# Patient Record
Sex: MALE | Race: BLACK OR AFRICAN AMERICAN | Employment: FULL TIME | URBAN - METROPOLITAN AREA
[De-identification: names, ages, dates, MRNs, and addresses within clinical notes are randomized per-mention and may not be internally consistent; named-entity substitution may affect disease eponyms.]

---

## 2019-12-30 ENCOUNTER — HOSPITAL ENCOUNTER (EMERGENCY)
Age: 24
Discharge: HOME | End: 2019-12-30
Attending: EMERGENCY MEDICINE
Payer: COMMERCIAL

## 2019-12-30 ENCOUNTER — APPOINTMENT (OUTPATIENT)
Dept: GENERAL RADIOLOGY | Age: 24
End: 2019-12-30
Payer: COMMERCIAL

## 2019-12-30 VITALS
HEART RATE: 70 BPM | HEIGHT: 70 IN | TEMPERATURE: 99.6 F | WEIGHT: 182 LBS | DIASTOLIC BLOOD PRESSURE: 58 MMHG | OXYGEN SATURATION: 93 % | SYSTOLIC BLOOD PRESSURE: 104 MMHG | BODY MASS INDEX: 26.05 KG/M2 | RESPIRATION RATE: 14 BRPM

## 2019-12-30 LAB
ANION GAP SERPL CALCULATED.3IONS-SCNC: 12 MMOL/L (ref 7–16)
BUN BLDV-MCNC: 9 MG/DL (ref 6–20)
CALCIUM SERPL-MCNC: 8.9 MG/DL (ref 8.6–10.2)
CHLORIDE BLD-SCNC: 95 MMOL/L (ref 98–107)
CO2: 27 MMOL/L (ref 22–29)
CREAT SERPL-MCNC: 1.3 MG/DL (ref 0.7–1.2)
GFR AFRICAN AMERICAN: >60
GFR NON-AFRICAN AMERICAN: >60 ML/MIN/1.73
GLUCOSE BLD-MCNC: 90 MG/DL (ref 74–99)
INFLUENZA A BY PCR: NOT DETECTED
INFLUENZA B BY PCR: DETECTED
POTASSIUM REFLEX MAGNESIUM: 3.7 MMOL/L (ref 3.5–5)
SODIUM BLD-SCNC: 134 MMOL/L (ref 132–146)
TROPONIN: <0.01 NG/ML (ref 0–0.03)

## 2019-12-30 PROCEDURE — 96374 THER/PROPH/DIAG INJ IV PUSH: CPT

## 2019-12-30 PROCEDURE — 6360000002 HC RX W HCPCS: Performed by: EMERGENCY MEDICINE

## 2019-12-30 PROCEDURE — 87502 INFLUENZA DNA AMP PROBE: CPT

## 2019-12-30 PROCEDURE — 93005 ELECTROCARDIOGRAM TRACING: CPT | Performed by: PHYSICIAN ASSISTANT

## 2019-12-30 PROCEDURE — 84484 ASSAY OF TROPONIN QUANT: CPT

## 2019-12-30 PROCEDURE — 99284 EMERGENCY DEPT VISIT MOD MDM: CPT

## 2019-12-30 PROCEDURE — 2580000003 HC RX 258: Performed by: EMERGENCY MEDICINE

## 2019-12-30 PROCEDURE — 6370000000 HC RX 637 (ALT 250 FOR IP): Performed by: PHYSICIAN ASSISTANT

## 2019-12-30 PROCEDURE — 71046 X-RAY EXAM CHEST 2 VIEWS: CPT

## 2019-12-30 PROCEDURE — 96375 TX/PRO/DX INJ NEW DRUG ADDON: CPT

## 2019-12-30 PROCEDURE — 80048 BASIC METABOLIC PNL TOTAL CA: CPT

## 2019-12-30 RX ORDER — DIPHENHYDRAMINE HYDROCHLORIDE 50 MG/ML
50 INJECTION INTRAMUSCULAR; INTRAVENOUS ONCE
Status: COMPLETED | OUTPATIENT
Start: 2019-12-30 | End: 2019-12-30

## 2019-12-30 RX ORDER — KETOROLAC TROMETHAMINE 30 MG/ML
15 INJECTION, SOLUTION INTRAMUSCULAR; INTRAVENOUS ONCE
Status: COMPLETED | OUTPATIENT
Start: 2019-12-30 | End: 2019-12-30

## 2019-12-30 RX ORDER — 0.9 % SODIUM CHLORIDE 0.9 %
1000 INTRAVENOUS SOLUTION INTRAVENOUS ONCE
Status: COMPLETED | OUTPATIENT
Start: 2019-12-30 | End: 2019-12-30

## 2019-12-30 RX ORDER — ACETAMINOPHEN 500 MG
1000 TABLET ORAL ONCE
Status: COMPLETED | OUTPATIENT
Start: 2019-12-30 | End: 2019-12-30

## 2019-12-30 RX ORDER — SODIUM CHLORIDE 0.9 % (FLUSH) 0.9 %
10 SYRINGE (ML) INJECTION PRN
Status: DISCONTINUED | OUTPATIENT
Start: 2019-12-30 | End: 2019-12-31 | Stop reason: HOSPADM

## 2019-12-30 RX ORDER — IBUPROFEN 800 MG/1
800 TABLET ORAL ONCE
Status: DISCONTINUED | OUTPATIENT
Start: 2019-12-30 | End: 2019-12-30

## 2019-12-30 RX ORDER — METOCLOPRAMIDE HYDROCHLORIDE 5 MG/ML
10 INJECTION INTRAMUSCULAR; INTRAVENOUS ONCE
Status: COMPLETED | OUTPATIENT
Start: 2019-12-30 | End: 2019-12-30

## 2019-12-30 RX ADMIN — METOCLOPRAMIDE 10 MG: 5 INJECTION, SOLUTION INTRAMUSCULAR; INTRAVENOUS at 21:08

## 2019-12-30 RX ADMIN — KETOROLAC TROMETHAMINE 15 MG: 30 INJECTION, SOLUTION INTRAMUSCULAR at 21:07

## 2019-12-30 RX ADMIN — ACETAMINOPHEN 1000 MG: 500 TABLET ORAL at 21:08

## 2019-12-30 RX ADMIN — SODIUM CHLORIDE 1000 ML: 9 INJECTION, SOLUTION INTRAVENOUS at 21:08

## 2019-12-30 RX ADMIN — DIPHENHYDRAMINE HYDROCHLORIDE 50 MG: 50 INJECTION, SOLUTION INTRAMUSCULAR; INTRAVENOUS at 21:08

## 2019-12-30 ASSESSMENT — PAIN SCALES - GENERAL
PAINLEVEL_OUTOF10: 0

## 2019-12-30 ASSESSMENT — ENCOUNTER SYMPTOMS
CHEST TIGHTNESS: 0
SHORTNESS OF BREATH: 0
BLOOD IN STOOL: 0
VOMITING: 0
COLOR CHANGE: 0
COUGH: 1
DIARRHEA: 0
NAUSEA: 0
WHEEZING: 1
RHINORRHEA: 1
SORE THROAT: 0
ABDOMINAL PAIN: 1
BACK PAIN: 0
EYE PAIN: 0

## 2019-12-30 NOTE — LETTER
5 [facility] Emergency Department  [address] [place] [address]  Phone: [phone]               December 30, 2019    Patient: Dave Briggs   YOB: 1995   Date of Visit: 12/30/2019       To Whom It May Concern:    Sadaf Brooks was seen and treated in our emergency department on 12/30/2019. He may return to work on 01/01/20.       Sincerely,       Meagan Wood RN         Signature:__________________________________

## 2019-12-31 LAB
EKG ATRIAL RATE: 107 BPM
EKG P AXIS: 38 DEGREES
EKG P-R INTERVAL: 132 MS
EKG Q-T INTERVAL: 300 MS
EKG QRS DURATION: 98 MS
EKG QTC CALCULATION (BAZETT): 400 MS
EKG R AXIS: -7 DEGREES
EKG T AXIS: 30 DEGREES
EKG VENTRICULAR RATE: 107 BPM

## 2019-12-31 PROCEDURE — 93010 ELECTROCARDIOGRAM REPORT: CPT | Performed by: INTERNAL MEDICINE

## 2019-12-31 NOTE — ED PROVIDER NOTES
Patient is a 55-year-old male with a history of pediatric asthma presenting for reported cough, fever, wheezing and headache for the past 2-1/2 days. States began over the weekend. T-max 102 at home. Was wheezing Saturday and Sunday but has improved today. Does not use albuterol as he has not had asthma issues since he was a young child. Has been taking Excedrin for his headache which has improved his fever but continues to have moderate headache. Is not the worst headache of his life. Is not sudden onset. Is mildly worse with light. Did receive his flu vaccination this year. Denies neck pain, confusion, rash, chest pain, shortness of breath, nausea, vomiting. States he did have lower abdominal pain 2 days ago which has resolved. Symptoms reportedly moderate in severity, worsened by nothing, improved by Excedrin. Review of Systems   Constitutional: Positive for fever. Negative for chills and diaphoresis. HENT: Positive for congestion and rhinorrhea. Negative for ear pain and sore throat. Eyes: Negative for pain and visual disturbance. Respiratory: Positive for cough and wheezing (resolved). Negative for chest tightness and shortness of breath. Cardiovascular: Negative for chest pain, palpitations and leg swelling. Gastrointestinal: Positive for abdominal pain (resolved). Negative for blood in stool, diarrhea, nausea and vomiting. Genitourinary: Negative for dysuria, flank pain, frequency and urgency. Musculoskeletal: Negative for back pain and neck pain. Skin: Negative for color change, rash and wound. Neurological: Positive for headaches. Negative for dizziness, syncope, weakness and light-headedness. Physical Exam  Vitals signs and nursing note reviewed. Constitutional:       General: He is not in acute distress. Appearance: Normal appearance. He is well-developed. He is not ill-appearing, toxic-appearing or diaphoretic.    HENT:      Head: Normocephalic and as well. No pneumonia on chest x-ray. No signs of myocarditis or pericarditis. No significant wheezing on exam.  Due to duration of his symptoms greater than 2 days, not felt to be appropriate candidate for antiviral medication. Given instructions on appropriate symptomatic management. Given concerning signs symptoms which return the emergency department. Additional verbal orders provided. I have spoken with the patient and discussed todays results, in addition to providing specific details for the plan of care and counseling regarding the diagnosis and prognosis. Their questions are answered at this time and they are agreeable with the plan. I discussed at length with them reasons for immediate return here for re evaluation. They will followup with their primary care physician by calling their office tomorrow. --------------------------------- ADDITIONAL PROVIDER NOTES ---------------------------------  At this time the patient is without objective evidence of an acute process requiring hospitalization or inpatient management. They have remained hemodynamically stable throughout their entire ED visit and are stable for discharge with outpatient follow-up. The plan has been discussed in detail and they are aware of the specific conditions for emergent return, as well as the importance of follow-up. There are no discharge medications for this patient. Diagnosis:  1. Influenza B        Disposition:  Patient's disposition: Discharge to home  Patient's condition is stable.        Mey Prado DO  Resident  12/31/19 0160

## 2019-12-31 NOTE — ED NOTES
FIRST PROVIDER CONTACT ASSESSMENT NOTE      Department of Emergency Medicine   ED  First Provider Note   12/30/19  7:44 PM    Chief Complaint: Cough (since Friday); Fever (states 100-102 all weekend, took excedrin today); Headache; and Wheezing (states wheezing at home)      History of Present Illness:    Laurie Wilkins is a 30 y.o. male who presents to the ED by private car for cough and fever 100 to 102 °F that started on Friday. Also reports slight headaches. Focused Screening Exam:  Constitutional:  Alert, appears stated age and is in no distress. *ALLERGIES*     Patient has no known allergies.      ED Triage Vitals [12/30/19 1942]   BP Temp Temp Source Pulse Resp SpO2 Height Weight   131/74 101.1 °F (38.4 °C) Oral 120 16 98 % -- --        Initial Plan of Care:  Initiate Treatment-Testing, Proceed toTreatment Area When Bed Available for ED Attending/MLP to Continue Care    -----------------[address] ASSESSMENT NOTE--------------  Electronically signed by Tremaine Garza PA-C   DD: 12/30/19       Tremaine Garza PA-C  12/30/19 1945

## 2021-01-20 ENCOUNTER — OFFICE VISIT (OUTPATIENT)
Dept: FAMILY MEDICINE CLINIC | Age: 26
End: 2021-01-20
Payer: COMMERCIAL

## 2021-01-20 VITALS
WEIGHT: 205 LBS | SYSTOLIC BLOOD PRESSURE: 130 MMHG | TEMPERATURE: 97.7 F | HEART RATE: 72 BPM | HEIGHT: 70 IN | BODY MASS INDEX: 29.35 KG/M2 | RESPIRATION RATE: 18 BRPM | DIASTOLIC BLOOD PRESSURE: 84 MMHG | OXYGEN SATURATION: 95 %

## 2021-01-20 DIAGNOSIS — Z23 NEED FOR TETANUS BOOSTER: ICD-10-CM

## 2021-01-20 DIAGNOSIS — Z00.00 ANNUAL PHYSICAL EXAM: Primary | ICD-10-CM

## 2021-01-20 PROCEDURE — G8419 CALC BMI OUT NRM PARAM NOF/U: HCPCS | Performed by: FAMILY MEDICINE

## 2021-01-20 PROCEDURE — G8484 FLU IMMUNIZE NO ADMIN: HCPCS | Performed by: FAMILY MEDICINE

## 2021-01-20 PROCEDURE — 1036F TOBACCO NON-USER: CPT | Performed by: FAMILY MEDICINE

## 2021-01-20 PROCEDURE — 90715 TDAP VACCINE 7 YRS/> IM: CPT | Performed by: FAMILY MEDICINE

## 2021-01-20 PROCEDURE — G8427 DOCREV CUR MEDS BY ELIG CLIN: HCPCS | Performed by: FAMILY MEDICINE

## 2021-01-20 PROCEDURE — 90471 IMMUNIZATION ADMIN: CPT | Performed by: FAMILY MEDICINE

## 2021-01-20 PROCEDURE — 99385 PREV VISIT NEW AGE 18-39: CPT | Performed by: FAMILY MEDICINE

## 2021-01-20 SDOH — HEALTH STABILITY: MENTAL HEALTH: HOW MANY STANDARD DRINKS CONTAINING ALCOHOL DO YOU HAVE ON A TYPICAL DAY?: NOT ASKED

## 2021-01-20 SDOH — HEALTH STABILITY: MENTAL HEALTH: HOW OFTEN DO YOU HAVE A DRINK CONTAINING ALCOHOL?: NEVER

## 2021-01-20 ASSESSMENT — ENCOUNTER SYMPTOMS
SORE THROAT: 0
CONSTIPATION: 0
NAUSEA: 0
SHORTNESS OF BREATH: 0
RHINORRHEA: 0
ABDOMINAL PAIN: 0
COUGH: 0
CHEST TIGHTNESS: 0
DIARRHEA: 0
VOMITING: 0

## 2021-01-20 ASSESSMENT — PATIENT HEALTH QUESTIONNAIRE - PHQ9
SUM OF ALL RESPONSES TO PHQ QUESTIONS 1-9: 0
2. FEELING DOWN, DEPRESSED OR HOPELESS: 0
SUM OF ALL RESPONSES TO PHQ9 QUESTIONS 1 & 2: 0
SUM OF ALL RESPONSES TO PHQ QUESTIONS 1-9: 0
1. LITTLE INTEREST OR PLEASURE IN DOING THINGS: 0
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
## 2021-01-20 NOTE — LETTER
[address] [address]  Phone: [phone]  Fax: [phone]    Nuha Zarate DO        January 20, 2021     Patient: Taniya Godinez   YOB: 1995   Date of Visit: 1/20/2021       To Whom It May Concern:    Taniya Godinez was seen in the office on 1/20/21 for a physical. It is my medical opinion that Margarita Schaeffer is medically able to preform the duties required to be in the [facility]. If you have any questions or concerns, please don't hesitate to call.     Sincerely,        Nuha Zarate DO

## 2023-06-25 ENCOUNTER — APPOINTMENT (OUTPATIENT)
Dept: CT IMAGING | Age: 28
End: 2023-06-25
Payer: COMMERCIAL

## 2023-06-25 ENCOUNTER — HOSPITAL ENCOUNTER (EMERGENCY)
Age: 28
Discharge: HOME | End: 2023-06-25
Payer: COMMERCIAL

## 2023-06-25 VITALS
HEART RATE: 56 BPM | BODY MASS INDEX: 27.4 KG/M2 | DIASTOLIC BLOOD PRESSURE: 69 MMHG | HEIGHT: 69 IN | WEIGHT: 185 LBS | TEMPERATURE: 98.4 F | OXYGEN SATURATION: 95 % | SYSTOLIC BLOOD PRESSURE: 114 MMHG | RESPIRATION RATE: 16 BRPM

## 2023-06-25 DIAGNOSIS — R51.9 NONINTRACTABLE HEADACHE, UNSPECIFIED CHRONICITY PATTERN, UNSPECIFIED HEADACHE TYPE: Primary | ICD-10-CM

## 2023-06-25 PROCEDURE — 96375 TX/PRO/DX INJ NEW DRUG ADDON: CPT

## 2023-06-25 PROCEDURE — 96374 THER/PROPH/DIAG INJ IV PUSH: CPT

## 2023-06-25 PROCEDURE — 2580000003 HC RX 258: Performed by: PHYSICIAN ASSISTANT

## 2023-06-25 PROCEDURE — 6360000002 HC RX W HCPCS: Performed by: PHYSICIAN ASSISTANT

## 2023-06-25 PROCEDURE — 99284 EMERGENCY DEPT VISIT MOD MDM: CPT

## 2023-06-25 PROCEDURE — 70450 CT HEAD/BRAIN W/O DYE: CPT

## 2023-06-25 RX ORDER — NAPROXEN 500 MG/1
500 TABLET ORAL 2 TIMES DAILY
Qty: 60 TABLET | Refills: 0 | Status: SHIPPED | OUTPATIENT
Start: 2023-06-25

## 2023-06-25 RX ORDER — KETOROLAC TROMETHAMINE 30 MG/ML
30 INJECTION, SOLUTION INTRAMUSCULAR; INTRAVENOUS ONCE
Status: COMPLETED | OUTPATIENT
Start: 2023-06-25 | End: 2023-06-25

## 2023-06-25 RX ORDER — 0.9 % SODIUM CHLORIDE 0.9 %
1000 INTRAVENOUS SOLUTION INTRAVENOUS ONCE
Status: COMPLETED | OUTPATIENT
Start: 2023-06-25 | End: 2023-06-25

## 2023-06-25 RX ORDER — DIPHENHYDRAMINE HYDROCHLORIDE 50 MG/ML
25 INJECTION INTRAMUSCULAR; INTRAVENOUS ONCE
Status: COMPLETED | OUTPATIENT
Start: 2023-06-25 | End: 2023-06-25

## 2023-06-25 RX ORDER — METOCLOPRAMIDE HYDROCHLORIDE 5 MG/ML
10 INJECTION INTRAMUSCULAR; INTRAVENOUS ONCE
Status: COMPLETED | OUTPATIENT
Start: 2023-06-25 | End: 2023-06-25

## 2023-06-25 RX ADMIN — SODIUM CHLORIDE 1000 ML: 9 INJECTION, SOLUTION INTRAVENOUS at 09:39

## 2023-06-25 RX ADMIN — METOCLOPRAMIDE 10 MG: 5 INJECTION, SOLUTION INTRAMUSCULAR; INTRAVENOUS at 09:41

## 2023-06-25 RX ADMIN — KETOROLAC TROMETHAMINE 30 MG: 30 INJECTION, SOLUTION INTRAMUSCULAR; INTRAVENOUS at 09:40

## 2023-06-25 RX ADMIN — DIPHENHYDRAMINE HYDROCHLORIDE 25 MG: 50 INJECTION, SOLUTION INTRAMUSCULAR; INTRAVENOUS at 09:40

## 2023-06-25 ASSESSMENT — PAIN - FUNCTIONAL ASSESSMENT: PAIN_FUNCTIONAL_ASSESSMENT: 0-10

## 2023-06-25 ASSESSMENT — PAIN DESCRIPTION - DESCRIPTORS: DESCRIPTORS: THROBBING

## 2023-06-25 ASSESSMENT — PAIN DESCRIPTION - LOCATION: LOCATION: HEAD

## 2023-06-25 ASSESSMENT — PAIN SCALES - GENERAL
PAINLEVEL_OUTOF10: 10
PAINLEVEL_OUTOF10: 10

## 2023-06-25 ASSESSMENT — LIFESTYLE VARIABLES
HOW OFTEN DO YOU HAVE A DRINK CONTAINING ALCOHOL: NEVER
HOW MANY STANDARD DRINKS CONTAINING ALCOHOL DO YOU HAVE ON A TYPICAL DAY: PATIENT DOES NOT DRINK

## 2023-06-25 ASSESSMENT — PAIN DESCRIPTION - ORIENTATION: ORIENTATION: RIGHT

## 2024-04-30 ENCOUNTER — OFFICE VISIT (OUTPATIENT)
Dept: FAMILY MEDICINE CLINIC | Age: 29
End: 2024-04-30
Payer: COMMERCIAL

## 2024-04-30 VITALS
HEIGHT: 69 IN | HEART RATE: 62 BPM | OXYGEN SATURATION: 97 % | TEMPERATURE: 97.8 F | DIASTOLIC BLOOD PRESSURE: 72 MMHG | BODY MASS INDEX: 30.69 KG/M2 | RESPIRATION RATE: 16 BRPM | SYSTOLIC BLOOD PRESSURE: 123 MMHG | WEIGHT: 207.2 LBS

## 2024-04-30 DIAGNOSIS — R79.89 ELEVATED SERUM CREATININE: ICD-10-CM

## 2024-04-30 DIAGNOSIS — Z00.00 WELL ADULT EXAM: Primary | ICD-10-CM

## 2024-04-30 LAB
ALBUMIN: 4.5 G/DL (ref 3.5–5.2)
ALP BLD-CCNC: 108 U/L (ref 40–129)
ALT SERPL-CCNC: 19 U/L (ref 0–40)
ANION GAP SERPL CALCULATED.3IONS-SCNC: 10 MMOL/L (ref 7–16)
AST SERPL-CCNC: 24 U/L (ref 0–39)
BILIRUB SERPL-MCNC: 0.2 MG/DL (ref 0–1.2)
BUN BLDV-MCNC: 13 MG/DL (ref 6–20)
CALCIUM SERPL-MCNC: 9.7 MG/DL (ref 8.6–10.2)
CHLORIDE BLD-SCNC: 103 MMOL/L (ref 98–107)
CO2: 26 MMOL/L (ref 22–29)
CREAT SERPL-MCNC: 1.1 MG/DL (ref 0.7–1.2)
GFR SERPL CREATININE-BSD FRML MDRD: >90 ML/MIN/1.73M2
GLUCOSE BLD-MCNC: 77 MG/DL (ref 74–99)
HCT VFR BLD CALC: 43.6 % (ref 37–54)
HEMOGLOBIN: 13.7 G/DL (ref 12.5–16.5)
MCH RBC QN AUTO: 27.8 PG (ref 26–35)
MCHC RBC AUTO-ENTMCNC: 31.4 G/DL (ref 32–34.5)
MCV RBC AUTO: 88.6 FL (ref 80–99.9)
PDW BLD-RTO: 14.3 % (ref 11.5–15)
PLATELET # BLD: 231 K/UL (ref 130–450)
PMV BLD AUTO: 10.5 FL (ref 7–12)
POTASSIUM SERPL-SCNC: 4.5 MMOL/L (ref 3.5–5)
RBC # BLD: 4.92 M/UL (ref 3.8–5.8)
SODIUM BLD-SCNC: 139 MMOL/L (ref 132–146)
TOTAL PROTEIN: 8 G/DL (ref 6.4–8.3)
WBC # BLD: 5.1 K/UL (ref 4.5–11.5)

## 2024-04-30 PROCEDURE — 99395 PREV VISIT EST AGE 18-39: CPT

## 2024-04-30 SDOH — ECONOMIC STABILITY: HOUSING INSECURITY
IN THE LAST 12 MONTHS, WAS THERE A TIME WHEN YOU DID NOT HAVE A STEADY PLACE TO SLEEP OR SLEPT IN A SHELTER (INCLUDING NOW)?: NO

## 2024-04-30 SDOH — ECONOMIC STABILITY: FOOD INSECURITY: WITHIN THE PAST 12 MONTHS, YOU WORRIED THAT YOUR FOOD WOULD RUN OUT BEFORE YOU GOT MONEY TO BUY MORE.: NEVER TRUE

## 2024-04-30 SDOH — ECONOMIC STABILITY: FOOD INSECURITY: WITHIN THE PAST 12 MONTHS, THE FOOD YOU BOUGHT JUST DIDN'T LAST AND YOU DIDN'T HAVE MONEY TO GET MORE.: NEVER TRUE

## 2024-04-30 SDOH — ECONOMIC STABILITY: INCOME INSECURITY: HOW HARD IS IT FOR YOU TO PAY FOR THE VERY BASICS LIKE FOOD, HOUSING, MEDICAL CARE, AND HEATING?: NOT HARD AT ALL

## 2024-04-30 ASSESSMENT — ENCOUNTER SYMPTOMS
RHINORRHEA: 0
CHEST TIGHTNESS: 0
CONSTIPATION: 0
NAUSEA: 0
DIARRHEA: 0
SHORTNESS OF BREATH: 0
COUGH: 0
ABDOMINAL PAIN: 0
SORE THROAT: 0
VOMITING: 0

## 2024-04-30 ASSESSMENT — PATIENT HEALTH QUESTIONNAIRE - PHQ9
1. LITTLE INTEREST OR PLEASURE IN DOING THINGS: NOT AT ALL
SUM OF ALL RESPONSES TO PHQ QUESTIONS 1-9: 0
2. FEELING DOWN, DEPRESSED OR HOPELESS: NOT AT ALL
SUM OF ALL RESPONSES TO PHQ9 QUESTIONS 1 & 2: 0
SUM OF ALL RESPONSES TO PHQ QUESTIONS 1-9: 0
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
## 2024-07-11 ENCOUNTER — TELEPHONE (OUTPATIENT)
Dept: FAMILY MEDICINE CLINIC | Age: 29
End: 2024-07-11

## 2024-07-11 NOTE — TELEPHONE ENCOUNTER
----- Message from Cathy Barth sent at 7/11/2024  9:43 AM EDT -----  Regarding: ECC Message to Provider  ECC Message to Provider    Relationship to Patient: Self     Additional Information Patient would like to know request for the physical result form with the signature of the provider to [employer] for work requirements. Thank you.  --------------------------------------------------------------------------------------------------------------------------    Call Back Information: OK to leave message on voicemail  Preferred Call Back Number: Phone [phone] (home)

## 2024-07-12 ENCOUNTER — TELEPHONE (OUTPATIENT)
Dept: FAMILY MEDICINE CLINIC | Age: 29
End: 2024-07-12

## 2024-07-12 NOTE — TELEPHONE ENCOUNTER
February 15, 2017       Monica Ponce   [address] [facility]  [place] [address]      Dear Monica Ponce:    Thank you for choosing [facility] for your Colonoscopy.    1.Your Colonoscopy is scheduled on 2/23/17.  2.Please arrive to register at the [facility] in [place] : 8:40 am.    (Please note that your procedure time can change due to unexpected events. We will do our best to inform you when there are changes or delays).    · What is a Colonoscopy?  A colonoscopy is an exam of the colon (large intestine or bowel) with a slim, flexible lighted tube called a colonoscope. Your health care provider can use the colonoscope to get a clear , magnified view of the inside of your colon from the anus to the area near the appendix.     COLONOSCOPY BOWEL PREP INSTRUCTIONS    The following instructions are very important. Please read the entire packet thoroughly and follow the instructions as outlined. The preparation for your procedure is very important. The bowel must be adequately cleansed for proper visualization. Follow the instructions carefully to avoid having to reschedule your procedure.               MATERIALS NEEDED FOR THE COLON PREP:    - Two (2) bottles of Magnesium Citrate (10 oz size) any flavor that is clear.  - Four (4) Dulcolax tablets.   You do not need a prescription for either of these laxatives and they can be purchased at any pharmacy.    You must have a  the day of your procedure as you may not drive for 24 hours after your procedure and have a responsible adult remain with you for 24 hours after this procedure. You cannot take public transportation home from your procedure. Your test will be cancelled if you do not have someone to drive you home. There will be no exceptions.   You may be contacted by telephone by the Pre-Admissions department prior to your procedure. If you are a woman who is pre-menopausal or menstruating, you will be asked to provide a urine  ----- Message from Cathy Barth sent at 7/11/2024  9:43 AM EDT -----  Regarding: ECC Message to Provider  ECC Message to Provider    Relationship to Patient: Self     Additional Information Patient would like to know request for the physical result form with the signature of the provider to [employer] for work requirements. Thank you.  --------------------------------------------------------------------------------------------------------------------------    Call Back Information: OK to leave message on voicemail  Preferred Call Back Number: Phone [phone] (home)       sample prior to your procedure. Your procedure is scheduled to begin 1 to 1 ½ hours after your arrival time. This time is needed to complete any necessary paperwork, prepare you for the exam, check your vital signs and meet with anesthesia personnel who will be providing your sedation for the exam. After the procedure you will go to the post-operative recovery area to wake up. You should plan to be at the hospital for about 3 hours. Please come dressed in comfortable clothes, leave all valuables including jewelry/purses at home. All piercing's need to be removed.     **Please be courteous to our staff and to other patients: if you must cancel this procedure, please do so as far in advance as possible.**      5 DAYS PRIOR TO YOUR PROCEDURE  · Stop anti-coagulants/blood thinner medication, also contact the physician that prescribes this medication to discuss special instructions. Contact our office if our instructions for stopping these medications are not acceptable.  · Stop all aspirin - containing products including ibuprofen and non-steroidal and anti-inflammatory products (NSAIDS). These include Ibuprofen, Advil, Motrin, Naprosyn, Aleve, Indocin and Celebrex. Contact your pharmacist if you have questions.  · You may use Tylenol (acetaminophen) as a substitute. If you need stronger pain medication contact your primary care physician.  · Stop all iron supplements.   · You may take your multivitamin.    **Insulin and other diabetic medications should be reviewed with your prescribing doctor and taken according to their instructions.**    2 DAYS PRIOR TO YOUR EXAM: Stop using fiber supplements and anti-diarrheal medications. Consume a low residue diet such as: Well cooked, tender meat, fish, chicken, eggs, white bread, buns, potato without skins, white rice, plain huyen toast, refined pasta and noodles, vegetable juices, canned fruit cocktail, grapes without skins, honeydew melon,peaches without skins, watermelon.  Stop eating fresh vegetables, salad and any popcorn, nuts or seeds.     THE DAY BEFORE YOUR PROCEDURE :  2/22/17 ,the entire day, you are to be on a clear liquid diet.  NO SOLID FOOD UNTIL AFTER YOUR PROCEDURE. consume only clear liquids You can see through.  See below:     This morning, go ahead and put the bottles of Magnesium Citrate in the refrigerator.  They taste better cold and you can mix with 7-Up (or similar beverage) when drinking.    FLUIDS ALLOWED: Clear liquids include: apple and white grape juice or other non-citrus juices without pulp, Gatorade or other sports drinks, ginger ale, diet or regular 7-Up, sprite or johan, Shawn Aid, water, clear broth, popsicles, honey and hard candies with filling. Also included in a clear liquid diet is black coffee or tea without cream or powdered creamer and jell-O without added fruit.    **Do not drink or consume anything that is red, purple or blue in color.  **Do not drink or consume any milk, dairy products, orange or tomato    juice.    Drink at least (1) 8-ounce glass of clear liquid every hour. The more clear liquids you drink throughout the day the better the laxatives will work for you. Be sure to drink plenty of fluids to stay well hydrated. Drink liquids with calories.    11:00 am:  TAKE THE 4 DULCOLAX TABLETS  AT ONE TIME.  You may                      continue on liquids. This can take effect in 1-4 hours, be near a                      bathroom during your preparation.    5:00 p.m.: DRINK THE FIRST BOTTLE OF MAGNESIUM CITRATE.  Continue                     on liquids as you have been during the day. (mix with 7-Up for better                   taste)    7:00 p.m.:  DRINK THE SECOND BOTTLE OF MAGNESIUM CITRATE.                                Continue on liquids as you have been during the day.  (mix with                            7-Up for better taste)     ALTERNATIVE: you can drink the 2nd bottle of magnesium citrate the day of your procedure 4 hours  before your arrival time.    If you experience nausea or vomiting, rinse your mouth with water, take a 15 to 30 minute break and then continue drinking the prep solution.    Then NOTHING TO DRINK AFTER MIDNIGHT.      THE DAY OF THE PROCEDURE:  Take your heart, blood pressure, seizure, inhalers and respiratory medications with a sip of water, enough to swallow them (no blood thinning or oral diabetic medications).    WHERE TO ARRIVE:    The [facility] is located at [address].  Enter the [facility] from [address], at the main [place] entrance across from [employer], then take the first left.  You will be in the Surgery Center parking lot.  The [facility] is on your right.  It has a covered drive up area for your convenience.      COLONOSCOPY    Your primary care doctor has recommended a colonoscopy.  Your doctor determines if the test is scheduled as a Screening or Diagnostic Colonoscopy.  He/she may order a screening test, however, during the procedure Dr. Madison may have to change the procedure to a diagnostic colonoscopy because of his/her findings.    Insurance Authorization    Our referral department will contact your insurance company for prior authorization only.  The authorization does NOT determine your benefits for the procedure.  We strongly encourage you to call your insurance company and ask for your benefits for both screening and diagnostic colonoscopy for your out of pocket expense.    YOUR COLONOSCOPY RESULTS    · You will receive a letter from Dr. Madison after the Colonoscopy with your results and recommendation if a follow up is needed.  If you have a biopsy, it may take up to 7-10 business days for you to receive your letter.  PLEASE CONTINUE TO  READ  FOR AN EXPLANATION OF POLYPS/COLONOSCOPY RESULTS AND SAVE THIS PACKET SO YOU WILL UNDERSTAND THE LETTER WHEN YOU RECEIVE IT.      COLON POLYPS are growths in the colon or rectum.  The cause of most polyps is  not known.  Most polyps do not cause symptoms , but large polyps are more likely than small polyps to cause symptoms such as rectal bleeding or pain.  Polyps or a sample of polyp tissue (called a biopsy) can be removed during the colonoscopy.  The tissue is examined by a pathologist to determine if it is the kind that could become cancer.    HYPERPLASTIC POLYPS are a type of non cancerous (benign) growth found in the colon.  They are usually small.  Hyperplastic polyps do not develop into cancer.    ADENOMATOUS POLYPS (pronounced add-in-oh-mat-us) are a type of abnormal growth in the colon.  While most colon polyps do not cause any problems, adenomatous polyps are thought to be the source of most colorectal cancer.  Adenomatous polyps usually grow very slowly, and it may be years before they turn into cancer, if they ever do.  They usually are discovered during a routine colonoscopy and are removed.  The discovery of adenomatous polyps in your colon means that you need to be screened for colorectal cancer more often than the average person.      **To help you prepare for your upcoming procedure, you will be sent an Internet Educational Program called [email or web address]. Instructions will be sent to you via email or phone from [facility]**                 If you have any questions or concerns, please do not hesitate to call.        Thank You,       Mehran Madison MD, FASCMidwest Orthopedic Specialty Hospital, [facility]  General / [place]-Rectal Surgery Dept  [facility] [address] [place]  [address]  Phone:  [phone]  Ext:  [phone]